# Patient Record
Sex: FEMALE | Race: WHITE | NOT HISPANIC OR LATINO | URBAN - NONMETROPOLITAN AREA
[De-identification: names, ages, dates, MRNs, and addresses within clinical notes are randomized per-mention and may not be internally consistent; named-entity substitution may affect disease eponyms.]

---

## 2021-08-30 ENCOUNTER — OFFICE VISIT (OUTPATIENT)
Dept: URBAN - NONMETROPOLITAN AREA CLINIC 2 | Facility: CLINIC | Age: 61
End: 2021-08-30
Payer: COMMERCIAL

## 2021-08-30 ENCOUNTER — WEB ENCOUNTER (OUTPATIENT)
Dept: URBAN - NONMETROPOLITAN AREA CLINIC 2 | Facility: CLINIC | Age: 61
End: 2021-08-30

## 2021-08-30 ENCOUNTER — DASHBOARD ENCOUNTERS (OUTPATIENT)
Age: 61
End: 2021-08-30

## 2021-08-30 ENCOUNTER — LAB OUTSIDE AN ENCOUNTER (OUTPATIENT)
Dept: URBAN - NONMETROPOLITAN AREA CLINIC 2 | Facility: CLINIC | Age: 61
End: 2021-08-30

## 2021-08-30 VITALS
BODY MASS INDEX: 29.49 KG/M2 | TEMPERATURE: 97.7 F | DIASTOLIC BLOOD PRESSURE: 92 MMHG | SYSTOLIC BLOOD PRESSURE: 161 MMHG | HEIGHT: 65 IN | HEART RATE: 68 BPM | WEIGHT: 177 LBS

## 2021-08-30 DIAGNOSIS — R19.5 POSITIVE COLORECTAL CANCER SCREENING USING COLOGUARD TEST: ICD-10-CM

## 2021-08-30 DIAGNOSIS — K59.4 PROCTALGIA FUGAX: ICD-10-CM

## 2021-08-30 PROBLEM — 62647006: Status: ACTIVE | Noted: 2021-08-30

## 2021-08-30 PROCEDURE — 99204 OFFICE O/P NEW MOD 45 MIN: CPT | Performed by: INTERNAL MEDICINE

## 2021-08-30 RX ORDER — HYOSCYAMINE SULFATE 0.12 MG/1
1 TABLET UNDER THE TONGUE AND ALLOW TO DISSOLVE  AS NEEDED TABLET SUBLINGUAL
Qty: 60 TABLET | Refills: 6 | OUTPATIENT
Start: 2021-08-30 | End: 2022-03-28

## 2021-08-30 RX ORDER — SODIUM SULFATE, MAGNESIUM SULFATE, AND POTASSIUM CHLORIDE 17.75; 2.7; 2.25 G/1; G/1; G/1
12 TABLETS TABLET ORAL
Qty: 24 TABLETS | Refills: 0 | OUTPATIENT
Start: 2021-08-30 | End: 2021-08-31

## 2021-08-30 NOTE — PHYSICAL EXAM NEUROLOGIC:
Regarding: Vomiting  ----- Message from Jeremy Phill sent at 12/30/2018  8:59 AM CST -----  Patient Name: Lula Smart  Specialist or PCP: Dr. Armani Mishra  Pregnant (If Yes, how long?): no  Symptoms: Vomiting   Call Back #: [phone]  Is the patientâs permanent residence located in [place], [facility], or a [address]?  Yes [facility]  Call Center Account #: [id number] oriented to person, place and time ,  , cranial nerves 2-12 grossly intact

## 2021-09-13 ENCOUNTER — OFFICE VISIT (OUTPATIENT)
Dept: URBAN - NONMETROPOLITAN AREA SURGERY CENTER 1 | Facility: SURGERY CENTER | Age: 61
End: 2021-09-13

## 2021-10-01 PROBLEM — 708699002: Status: ACTIVE | Noted: 2021-08-30

## 2021-10-25 ENCOUNTER — OFFICE VISIT (OUTPATIENT)
Dept: URBAN - NONMETROPOLITAN AREA SURGERY CENTER 1 | Facility: SURGERY CENTER | Age: 61
End: 2021-10-25

## 2021-10-25 ENCOUNTER — CLAIMS CREATED FROM THE CLAIM WINDOW (OUTPATIENT)
Dept: URBAN - NONMETROPOLITAN AREA SURGERY CENTER 1 | Facility: SURGERY CENTER | Age: 61
End: 2021-10-25
Payer: COMMERCIAL

## 2021-10-25 ENCOUNTER — CLAIMS CREATED FROM THE CLAIM WINDOW (OUTPATIENT)
Dept: URBAN - METROPOLITAN AREA CLINIC 4 | Facility: CLINIC | Age: 61
End: 2021-10-25
Payer: COMMERCIAL

## 2021-10-25 DIAGNOSIS — K63.5 BENIGN COLON POLYP: ICD-10-CM

## 2021-10-25 DIAGNOSIS — R19.5 ABNORMAL CONSISTENCY OF STOOL: ICD-10-CM

## 2021-10-25 DIAGNOSIS — K63.89 POLYP, HYPERPLASTIC: ICD-10-CM

## 2021-10-25 PROCEDURE — G8907 PT DOC NO EVENTS ON DISCHARG: HCPCS | Performed by: INTERNAL MEDICINE

## 2021-10-25 PROCEDURE — 45385 COLONOSCOPY W/LESION REMOVAL: CPT | Performed by: INTERNAL MEDICINE

## 2021-10-25 PROCEDURE — 88305 TISSUE EXAM BY PATHOLOGIST: CPT | Performed by: PATHOLOGY
